# Patient Record
Sex: FEMALE | Race: WHITE | NOT HISPANIC OR LATINO | Employment: UNEMPLOYED | URBAN - METROPOLITAN AREA
[De-identification: names, ages, dates, MRNs, and addresses within clinical notes are randomized per-mention and may not be internally consistent; named-entity substitution may affect disease eponyms.]

---

## 2024-05-13 ENCOUNTER — TELEPHONE (OUTPATIENT)
Age: 59
End: 2024-05-13

## 2024-05-13 NOTE — TELEPHONE ENCOUNTER
Rec'd call from patient requesting to schedule breast reduction consultation with Dr. Evangelista.    Explained office policy/procedure, role of patient care coordinator, Cynthia. Explained that Cynthia would contact patient (may take upwards of up to two weeks) to discuss requirements to get procedure medically covered.    Patient verbalized understanding.    Patient states that she has [employer] of [place]  #[id number]    Patient states that her PCP is   Yariel Baez MD  [address] [place] [address] [phone]

## 2024-05-17 ENCOUNTER — TELEPHONE (OUTPATIENT)
Dept: PLASTIC SURGERY | Facility: CLINIC | Age: 59
End: 2024-05-17

## 2024-05-21 ENCOUNTER — TELEPHONE (OUTPATIENT)
Dept: PLASTIC SURGERY | Facility: CLINIC | Age: 59
End: 2024-05-21

## 2024-05-21 NOTE — TELEPHONE ENCOUNTER
Replied to patient's email and let her know that although the MRI, post op report and neurosurgey report were sent, we still need the 6 months of pt or chiro notes as well as current mammogram in order to make breast reduction consultation.

## 2024-06-14 ENCOUNTER — TELEPHONE (OUTPATIENT)
Dept: PLASTIC SURGERY | Facility: CLINIC | Age: 59
End: 2024-06-14

## 2024-06-14 NOTE — TELEPHONE ENCOUNTER
Replied to patient's email to let her know we received one note from her Chiropractor for January and two notes from her PCP.  Advised we still need five more months of chiropractic treatment notes documenting issues related to her large breasts in order to have a consultation.

## 2024-06-17 ENCOUNTER — TELEPHONE (OUTPATIENT)
Dept: PLASTIC SURGERY | Facility: CLINIC | Age: 59
End: 2024-06-17

## 2024-06-17 NOTE — TELEPHONE ENCOUNTER
Replied to patient's email that the only office note we have is one note from her MD discussing left sided back pain.  Explained we need all criteria met in order to have the Breast Reduction consultation.

## 2024-07-01 ENCOUNTER — TELEPHONE (OUTPATIENT)
Dept: PLASTIC SURGERY | Facility: CLINIC | Age: 59
End: 2024-07-01

## 2024-07-01 NOTE — TELEPHONE ENCOUNTER
Replied to patient's email and advised we received notes from [facility] from 2018 and May and June of 2024.  Advised we still need 4 more months of current documentation.

## 2024-07-29 ENCOUNTER — TELEPHONE (OUTPATIENT)
Dept: PLASTIC SURGERY | Facility: CLINIC | Age: 59
End: 2024-07-29

## 2024-07-29 NOTE — TELEPHONE ENCOUNTER
Returned patient's call and advised we need at least 2 more months of physical therapy documentation for a breast reduction consultation.  Patient will advise when complete.

## 2024-09-16 ENCOUNTER — TELEPHONE (OUTPATIENT)
Dept: PLASTIC SURGERY | Facility: CLINIC | Age: 59
End: 2024-09-16

## 2024-09-16 NOTE — TELEPHONE ENCOUNTER
Replied to patient's email that I do not have the current chiropractor notes and to please advise when they are sent.  Need these to set up breast reduction consultation.

## 2024-09-17 ENCOUNTER — TELEPHONE (OUTPATIENT)
Dept: PLASTIC SURGERY | Facility: CLINIC | Age: 59
End: 2024-09-17

## 2024-09-17 NOTE — TELEPHONE ENCOUNTER
Replied to patient's email that I had sent her information yesterday and resent.  Advised we do have a chiropractor letter but need more months of chiropractor notes that are current.  Gave fax number to have sent to me when complete.

## 2024-10-03 ENCOUNTER — TELEPHONE (OUTPATIENT)
Dept: PLASTIC SURGERY | Facility: CLINIC | Age: 59
End: 2024-10-03

## 2024-10-03 NOTE — TELEPHONE ENCOUNTER
Received call from patient regarding the status of her insurance prior to Breast Reduction Surgery. Cynthia TSAI not available, asking Cynthia to contact patient back.     Cynthia TSAI: Please contact patient regarding status of insurance prior to procedure.

## 2024-10-10 ENCOUNTER — TELEPHONE (OUTPATIENT)
Dept: PLASTIC SURGERY | Facility: CLINIC | Age: 59
End: 2024-10-10

## 2024-10-10 NOTE — TELEPHONE ENCOUNTER
Replied to patient's email that we need July, August and September chiropractic notes for a consultation.  Provided direct fax number and asked to advise when being sent over.

## 2024-10-11 ENCOUNTER — TELEPHONE (OUTPATIENT)
Dept: PLASTIC SURGERY | Facility: CLINIC | Age: 59
End: 2024-10-11

## 2024-10-21 ENCOUNTER — OFFICE VISIT (OUTPATIENT)
Dept: PLASTIC SURGERY | Facility: CLINIC | Age: 59
End: 2024-10-21
Payer: COMMERCIAL

## 2024-10-21 VITALS
DIASTOLIC BLOOD PRESSURE: 80 MMHG | TEMPERATURE: 98.8 F | WEIGHT: 155 LBS | HEIGHT: 62 IN | HEART RATE: 71 BPM | BODY MASS INDEX: 28.52 KG/M2 | SYSTOLIC BLOOD PRESSURE: 122 MMHG

## 2024-10-21 DIAGNOSIS — N62 MACROMASTIA: Primary | ICD-10-CM

## 2024-10-21 PROCEDURE — 99204 OFFICE O/P NEW MOD 45 MIN: CPT | Performed by: STUDENT IN AN ORGANIZED HEALTH CARE EDUCATION/TRAINING PROGRAM

## 2024-10-21 RX ORDER — BUPROPION HYDROCHLORIDE 150 MG/1
TABLET ORAL
COMMUNITY

## 2024-10-21 RX ORDER — ATORVASTATIN CALCIUM 80 MG/1
TABLET, FILM COATED ORAL
COMMUNITY

## 2024-10-21 RX ORDER — LEVOTHYROXINE SODIUM 88 MCG
TABLET ORAL
COMMUNITY

## 2024-10-21 RX ORDER — ALPRAZOLAM 0.5 MG
TABLET ORAL
COMMUNITY

## 2024-10-21 RX ORDER — OMEPRAZOLE 40 MG/1
CAPSULE, DELAYED RELEASE ORAL
COMMUNITY

## 2024-10-21 RX ORDER — VENLAFAXINE HYDROCHLORIDE 150 MG/1
CAPSULE, EXTENDED RELEASE ORAL
COMMUNITY

## 2024-10-21 NOTE — PROGRESS NOTES
Plastic Surgery Consult    Reason for visit: heavy breasts    HPI from 10/21/24  Patient is a 58 y/o female who presents with symptomatic macromastia. She complains of large, heavy, pendulous breasts that causes her chest, upper back, neck, and shoulder discomfort. She also complains of rashes underneath her breast during humid weather and has difficulty exercising. She has tried conservative therapy including supportive bra, ibuprofen, and creams/powders under her breasts with minimal symptomatic improvement. She denies lumps or discharge on self-exams.    She currently wears DD and would like to be C cup ideally.    Mammogram status: Need up-to-date mammogram    Plans to have more children: none    Physical therapy attempted: completed       ROS: 12 pt ROS negative, except as otherwise noted in HPI.    Past Medical History:   Diagnosis Date    Degenerative disc disease, lumbar     Hyperthyroidism      FamHx: non-contrib  SurgHx: no breast surgeries. Has had ACDF, punch biopsy of breast lesion (benign)  SocHx: e-cigarette  (nicotine), +social ETOH  Meds: no blood thinners, no steroids  Allergies: NKDA, codeine (n/V)    PE:    Vitals:    10/21/24 1403   BP: 122/80   Pulse: 71   Temp: 98.8 °F (37.1 °C)       General: NC/AT, breathing comfortably on RA  Neuro: CN II-XII grossly intact, symmetric reflexes  HEENT: PERRLA, EOMI, external ears normal, no lesions or deformities, neck supple, trachea midline  Respiratory: CTAB, normal respiratory effort  Cardio: RRR, normal S1, S2, no murmur, rubs, gallops  GI: soft, non-tender, non-distended  MSK: normal alignment, mobility, gait  Skin: no rashes, lesions, subcutaneous nodules      BMI:  28.4  BSA: 1.72    Breast Exam:  Bilateral mod-large ptotic breasts, with severe grade II ptosis bilaterally  Right side slightly larger than left  Enlarged areolas  Bilateral shoulder grooving  No masses, skin dimpling, or discharge  Normal nipple sensation  bilaterally      Measurements:    R  L  SN-N  29 cm  30 cm  N-IMF  14 cm  15 cm    Imaging: need mammogram    A/P: 58 y/o female with symptomatic macromastia.    -Patient would benefit from bilateral reduction mammoplasty. Technique: wise-pattern with inferior pedicle with estimated resection weight of 300-400 g per side. Any greater resection would make her flat chested.  -Discussed with patient the risks, benefits, procedure, and complications. Discussed changes/loss/hypersentivity in nipple sensation, diminished breast feeding ability and increase in size of breasts should she become pregnant. Discussed risk of partial vs complete nipple loss due to vascular issues. Discussed that breast reduction will help but may not completely resolve her back, chest, upper neck, and shoulder pain. Due to patient's obesity, I discussed the increased risk of surgical complications including infection, seroma, fat necrosis, abscess, wound dehisence. All of these risks increase with increasing BMI. Due to her large pendulous breasts, the possibility of free nipple graft was also discussed.   -Discussed importance of all smoking, nicotine cessation for at least 6 weeks prior to any surgical intervention due to adverse effects on wound healing and increased risks of complications. Patient acknowledged.  -Patient needs up-to-date mammogram prior to any intervention  -Also discussed that patient has baseline chronic neck pain and breast reduction would help alleviate, but not completely resolve her upper back and neck pain. Patient acknowledged.  -Patient's questions answered, concerns addressed.  -Photos taken, will submit to insurance  -Spent 45 minutes in consultation with patient. Greater than 50% of the total time was spent obtaining history, evaluation, performing exam, discussion of management options including post-operative care, answering patient's questions and concerns, chart reviewing, and documentation      Benny CARTWRIGHT  MD [place]   [facility]   [address] [place] [address], [address]   [place] [address]   Office: [phone]

## 2024-11-01 ENCOUNTER — PREP FOR PROCEDURE (OUTPATIENT)
Dept: PLASTIC SURGERY | Facility: CLINIC | Age: 59
End: 2024-11-01

## 2024-11-01 ENCOUNTER — TELEPHONE (OUTPATIENT)
Age: 59
End: 2024-11-01

## 2024-11-01 DIAGNOSIS — M54.9 DORSALGIA: ICD-10-CM

## 2024-11-01 DIAGNOSIS — N62 HYPERTROPHY OF BREAST: Primary | ICD-10-CM

## 2024-11-01 NOTE — TELEPHONE ENCOUNTER
Patient called asking to speak to Beny she said she was just talking to him and he assisted her in scheduling her apt.   If you can please call her back.    Thank you

## 2024-11-11 ENCOUNTER — OFFICE VISIT (OUTPATIENT)
Dept: PLASTIC SURGERY | Facility: CLINIC | Age: 59
End: 2024-11-11
Payer: COMMERCIAL

## 2024-11-11 VITALS
HEIGHT: 62 IN | HEART RATE: 69 BPM | TEMPERATURE: 98.3 F | SYSTOLIC BLOOD PRESSURE: 122 MMHG | WEIGHT: 151 LBS | BODY MASS INDEX: 27.79 KG/M2 | DIASTOLIC BLOOD PRESSURE: 80 MMHG

## 2024-11-11 DIAGNOSIS — N62 MACROMASTIA: Primary | ICD-10-CM

## 2024-11-11 PROCEDURE — 99214 OFFICE O/P EST MOD 30 MIN: CPT | Performed by: STUDENT IN AN ORGANIZED HEALTH CARE EDUCATION/TRAINING PROGRAM

## 2024-11-11 RX ORDER — ONDANSETRON 4 MG/1
4 TABLET, FILM COATED ORAL EVERY 8 HOURS PRN
Qty: 27 TABLET | Refills: 0 | Status: SHIPPED | OUTPATIENT
Start: 2024-11-11

## 2024-11-11 RX ORDER — OXYCODONE HYDROCHLORIDE 5 MG/1
5 TABLET ORAL EVERY 6 HOURS PRN
Qty: 24 TABLET | Refills: 0 | Status: SHIPPED | OUTPATIENT
Start: 2024-11-11

## 2024-11-11 RX ORDER — DOCUSATE SODIUM 100 MG/1
100 CAPSULE, LIQUID FILLED ORAL 2 TIMES DAILY PRN
Qty: 20 CAPSULE | Refills: 1 | Status: SHIPPED | OUTPATIENT
Start: 2024-11-11

## 2024-11-11 RX ORDER — ACETAMINOPHEN 500 MG
500 TABLET ORAL EVERY 4 HOURS PRN
Qty: 30 TABLET | Refills: 2 | Status: SHIPPED | OUTPATIENT
Start: 2024-11-11

## 2024-11-11 NOTE — PROGRESS NOTES
Plastic Surgery Consult     Reason for visit: preop for BBR for symptomatic macromastia    HPI from 11/11/24  Patient seen and examined.    Discussed risks, benefits, procedure detail, complications, and postop care for BBR including but not limited to scarring, wound healing, abscesses, seromas, fat necrosis, changes in nipple sensation, changes in breast feeding ability, partial vs complete nipple necrosis, and possible need for free nipple graft.    Estimated amount of reduction per side is 300-400 g. Patient currently wears DD and would like something C cup ideally. I mentioned that patient will be likely on the smaller side of Full B to ensure reaching insurance thresholds. Patient acknowledged that she is okay being smaller.    Patient has stopped all nicotine products for last 4 weeks. Instructed to continue nicotine cessation.     Patient has recent mammogram that was normal.    I informed her that breast reduction will not completely treat her upper back and chronic neck pain, but will improve it. Patient acknowledged.    BMI:  28.4  BSA: 1.72     Breast Exam:  Bilateral mod-large ptotic breasts, with severe grade II ptosis bilaterally  Right side slightly larger than left  Enlarged areolas  Bilateral shoulder grooving  No masses, skin dimpling, or discharge  Normal nipple sensation bilaterally        Measurements:                          R                      L  SN-N               29 cm              30 cm  N-IMF              14 cm              15 cm     All questions answered, concerns addressed. Consents obtained.  Will require tumescence and experal  Will send postop prescriptions to pharmacy today  -Spent 35 minutes in consultation with patient. Greater than 50% of the total time was spent obtaining history, evaluation, performing exam, discussion of management options including post-operative care, answering patient's questions and concerns, chart reviewing, and documentation    Benny Blake MD    [facility]   [address] [place] [address], [address]   [place] [address]   Office: [phone]    HPI from 10/21/24  Patient is a 58 y/o female who presents with symptomatic macromastia. She complains of large, heavy, pendulous breasts that causes her chest, upper back, neck, and shoulder discomfort. She also complains of rashes underneath her breast during humid weather and has difficulty exercising. She has tried conservative therapy including supportive bra, ibuprofen, and creams/powders under her breasts with minimal symptomatic improvement. She denies lumps or discharge on self-exams.     She currently wears DD and would like to be C cup ideally.     Mammogram status: Need up-to-date mammogram     Plans to have more children: none     Physical therapy attempted: completed         ROS: 12 pt ROS negative, except as otherwise noted in HPI.     Medical History        Past Medical History:   Diagnosis Date    Degenerative disc disease, lumbar      Hyperthyroidism           FamHx: non-contrib  SurgHx: no breast surgeries. Has had ACDF, punch biopsy of breast lesion (benign)  SocHx: e-cigarette  (nicotine), +social ETOH  Meds: no blood thinners, no steroids  Allergies: NKDA, codeine (n/V)     PE:         Vitals:     10/21/24 1403   BP: 122/80   Pulse: 71   Temp: 98.8 °F (37.1 °C)         General: NC/AT, breathing comfortably on RA  Neuro: CN II-XII grossly intact, symmetric reflexes  HEENT: PERRLA, EOMI, external ears normal, no lesions or deformities, neck supple, trachea midline  Respiratory: CTAB, normal respiratory effort  Cardio: RRR, normal S1, S2, no murmur, rubs, gallops  GI: soft, non-tender, non-distended  MSK: normal alignment, mobility, gait  Skin: no rashes, lesions, subcutaneous nodules        BMI:  28.4  BSA: 1.72     Breast Exam:  Bilateral mod-large ptotic breasts, with severe grade II ptosis bilaterally  Right side slightly larger than left  Enlarged areolas  Bilateral  shoulder grooving  No masses, skin dimpling, or discharge  Normal nipple sensation bilaterally        Measurements:                          R                      L  SN-N               29 cm              30 cm  N-IMF              14 cm              15 cm     Imaging: need mammogram     A/P: 60 y/o female with symptomatic macromastia.     -Patient would benefit from bilateral reduction mammoplasty. Technique: wise-pattern with inferior pedicle with estimated resection weight of 300-400 g per side. Any greater resection would make her flat chested.  -Discussed with patient the risks, benefits, procedure, and complications. Discussed changes/loss/hypersentivity in nipple sensation, diminished breast feeding ability and increase in size of breasts should she become pregnant. Discussed risk of partial vs complete nipple loss due to vascular issues. Discussed that breast reduction will help but may not completely resolve her back, chest, upper neck, and shoulder pain. Due to patient's obesity, I discussed the increased risk of surgical complications including infection, seroma, fat necrosis, abscess, wound dehisence. All of these risks increase with increasing BMI. Due to her large pendulous breasts, the possibility of free nipple graft was also discussed.   -Discussed importance of all smoking, nicotine cessation for at least 6 weeks prior to any surgical intervention due to adverse effects on wound healing and increased risks of complications. Patient acknowledged.  -Patient needs up-to-date mammogram prior to any intervention  -Also discussed that patient has baseline chronic neck pain and breast reduction would help alleviate, but not completely resolve her upper back and neck pain. Patient acknowledged.  -Patient's questions answered, concerns addressed.  -Photos taken, will submit to insurance  -Spent 45 minutes in consultation with patient. Greater than 50% of the total time was spent obtaining history,  evaluation, performing exam, discussion of management options including post-operative care, answering patient's questions and concerns, chart reviewing, and documentation        Benny Blake MD   [facility]   [address] [place] [address], [address]   [place] [address]   Office: [phone]